# Patient Record
Sex: MALE | URBAN - METROPOLITAN AREA
[De-identification: names, ages, dates, MRNs, and addresses within clinical notes are randomized per-mention and may not be internally consistent; named-entity substitution may affect disease eponyms.]

---

## 2018-01-30 NOTE — PATIENT DISCUSSION
Eylea #1 injection recommended today after discussion of benefits, risks, alternatives. The patient elects to proceed. The injection was administered without complication. Post-injection instructions were reviewed and understood by the patient.

## 2018-01-30 NOTE — PROCEDURE NOTE: CLINICAL
PROCEDURE NOTE: Eylea #1 OS. Diagnosis: Neovascular AMD with Active CNV. Prep: Betadine Drops and Betadine Scrub. Prior to injection, risks/benefits/alternatives discussed including corneal abrasion, infection, loss of vision, hemorrhage, cataract, glaucoma, retinal tears or detachment. A written consent is on file, and the need for today’s injection was discussed and the patient is understanding and wishes to proceed. The entire vial of Eylea was drawn up into a syringe. The opened vial, remaining drug, and filtered needle were disposed of in a certified biohazard container. Betadine prep was performed. Topical anesthesia was induced with Alcaine. 4% lidocaine pledge. A lid speculum was used. A short 30g needle on a 1cc syringe was used with product that that had previously been prepared under sterile conditions. Injection site: 3-4 mm from the limbus. The used syringe/needle was transferred to a biohazard container. Lid speculum removed. Mask worn during procedure. Patient tolerated procedure well. Count fingers vision was verified. There were no complications. Patient was given the standard instruction sheet. Patient given office phone number/answering service number and advised to call immediately should there be loss of vision or pain, or should they have any other questions or concerns. Krystal Flores

## 2018-03-13 NOTE — PATIENT DISCUSSION
Patient understands condition, prognosis and need for follow up care.  Patient to follow up in [place] for surgery.

## 2018-03-13 NOTE — PATIENT DISCUSSION
Stable PED, Stable Vision, No new SRF, No new IRF. Recommend treatment to maintain current level of vision. Eylea injections 3/13/18 and 01/30/18.  Continue Eylea or Avastin per [place] doctor q 6-7 weeks.

## 2018-03-13 NOTE — PROCEDURE NOTE: CLINICAL
PROCEDURE NOTE: Eylea #2 OS. Diagnosis: Neovascular AMD with Active CNV. Prior to injection, risks/benefits/alternatives discussed including corneal abrasion, infection, loss of vision, hemorrhage, cataract, glaucoma, retinal tears or detachment. A written consent is on file, and the need for today’s injection was discussed and the patient is understanding and wishes to proceed. The entire vial of Eylea was drawn up into a syringe. The opened vial, remaining drug, and filtered needle were disposed of in a certified biohazard container. Betadine prep was performed. Topical anesthesia was induced with Alcaine. 4% lidocaine pledge. A lid speculum was used. A short 30g needle on a 1cc syringe was used with product that that had previously been prepared under sterile conditions. Injection site: 3-4 mm from the limbus. The used syringe/needle was transferred to a biohazard container. Lid speculum removed. Mask worn during procedure. Patient tolerated procedure well. Count fingers vision was verified. There were no complications. Patient was given the standard instruction sheet. Patient given office phone number/answering service number and advised to call immediately should there be loss of vision or pain, or should they have any other questions or concerns. Vipul Crouch

## 2018-03-13 NOTE — PATIENT DISCUSSION
Eylea #2 injection recommended today after discussion of benefits, risks, alternatives. The patient elects to proceed. The injection was administered without complication. Post-injection instructions were reviewed and understood by the patient.

## 2019-02-04 NOTE — PATIENT DISCUSSION
Avastin #49 injection recommended today after discussion of benefits, risks, alternatives, and off-label use. The injection was administered without complication. Post-injection instructions were reviewed and understood by the patient.

## 2019-02-04 NOTE — PATIENT DISCUSSION
Pt edu no fluid seen on exam or OCT today. Recommend continuing with treating Q7W. Recommend Avastin #49 today. R & B discussed in detail. Pt elects to proceed. Pt leaving to go [facility], recommend following up with Dr. David Kowalski in 7-8 weeks. Records given to patient.

## 2019-02-04 NOTE — PROCEDURE NOTE: CLINICAL
PROCEDURE NOTE: Avastin () #49 OS. Diagnosis: Neovascular AMD with Active CNV. Prior to the original injection, risks/benefits/alternatives discussed including infection, loss of vision, hemorrhage, cataract, glaucoma, retinal tears or detachment. A written consent is on file, and the need for today’s injection was discussed and the patient is understanding and wishes to proceed. The off-label status of Intravitreal Avastin also was reviewed. The patient wished to proceed with treatment. The patient wished to proceed with treatment. Topical anesthetic drops were applied to the eye. Betadine prep was performed. Surgical mask worn. Sterile drape and lid speculum were applied. Using the syringe provided, Avastin 1.25 mg in 0.05 cc was injected into the vitreous cavity. Injection site: 3-4 mm from the limbus. Patient tolerated procedure well. Following the intravitreal injection, the sterile lid speculum was removed. CRA perfusion confirmed. CF vision checked. Patient given office phone number/answering service number and advised to call immediately should there be an increase in floaters or redness, loss of vision or pain, or should they have any other questions or concerns. Pancho Sorto

## 2019-08-26 ENCOUNTER — IMPORTED ENCOUNTER (OUTPATIENT)
Dept: URBAN - METROPOLITAN AREA CLINIC 9 | Facility: CLINIC | Age: 79
End: 2019-08-26

## 2020-10-15 ENCOUNTER — IMPORTED ENCOUNTER (OUTPATIENT)
Dept: URBAN - METROPOLITAN AREA CLINIC 9 | Facility: CLINIC | Age: 80
End: 2020-10-15

## 2021-10-16 ASSESSMENT — VISUAL ACUITY
OD_CC: 20/30 - SN
OD_CC: 20/25 SN
OS_PH: 20/30 - SN
OS_CC: 20/30 SN
OS_CC: 20/25 - SN
OS_SC: 20/50 - SN
OD_CC: 20/40 SN
OD_PH: 20/30 - SN
OS_CC: 20/40 - SN
OD_SC: 20/70 - SN

## 2021-10-16 ASSESSMENT — TONOMETRY
OD_IOP_MMHG: 12
OS_IOP_MMHG: 8
OD_IOP_MMHG: 10
OS_IOP_MMHG: 11

## 2022-06-19 RX ORDER — CICLOPIROX 7.7 MG/G
GEL TOPICAL
COMMUNITY

## 2022-06-19 RX ORDER — GABAPENTIN 100 MG/1
CAPSULE ORAL
COMMUNITY

## 2022-06-19 RX ORDER — ATORVASTATIN CALCIUM 40 MG/1
TABLET, FILM COATED ORAL
COMMUNITY

## 2022-06-19 RX ORDER — IBUPROFEN 200 MG
TABLET ORAL
COMMUNITY

## 2022-06-19 RX ORDER — LISINOPRIL 10 MG/1
TABLET ORAL
COMMUNITY
End: 2022-09-19

## 2022-08-10 PROBLEM — M17.11 PRIMARY OSTEOARTHRITIS OF RIGHT KNEE: Status: ACTIVE | Noted: 2022-08-10

## 2022-08-10 PROBLEM — I10 ESSENTIAL HYPERTENSION: Status: ACTIVE | Noted: 2022-08-10

## 2022-08-10 PROBLEM — M19.011 PRIMARY OSTEOARTHRITIS OF RIGHT SHOULDER: Status: ACTIVE | Noted: 2022-08-10

## 2022-08-10 PROBLEM — R20.9 SKIN SENSATION DISTURBANCE: Status: ACTIVE | Noted: 2022-08-10

## 2022-08-10 PROBLEM — I71.40 ABDOMINAL AORTIC ANEURYSM (AAA) WITHOUT RUPTURE: Status: ACTIVE | Noted: 2022-08-10

## 2022-08-10 PROBLEM — N52.9 IMPOTENCE OF ORGANIC ORIGIN: Status: ACTIVE | Noted: 2022-08-10

## 2022-08-10 PROBLEM — E11.9 TYPE 2 DIABETES MELLITUS WITHOUT COMPLICATION (HCC): Status: ACTIVE | Noted: 2022-08-10

## 2022-08-10 PROBLEM — M54.2 CERVICALGIA: Status: ACTIVE | Noted: 2022-08-10

## 2022-08-10 PROBLEM — R29.898 DROPPED HEAD SYNDROME: Status: ACTIVE | Noted: 2022-08-10

## 2022-08-10 PROBLEM — I72.4 ANEURYSM OF POPLITEAL ARTERY (HCC): Status: ACTIVE | Noted: 2022-08-10

## 2022-08-10 PROBLEM — C61 MALIGNANT TUMOR OF PROSTATE (HCC): Status: ACTIVE | Noted: 2022-08-10

## 2022-08-10 PROBLEM — M25.511 PAIN IN RIGHT SHOULDER: Status: ACTIVE | Noted: 2022-08-10

## 2022-08-10 PROBLEM — E66.9 OBESITY: Status: ACTIVE | Noted: 2022-08-10

## 2022-08-10 PROBLEM — E78.5 HYPERLIPIDEMIA: Status: ACTIVE | Noted: 2022-08-10

## 2022-08-10 PROBLEM — E55.9 VITAMIN D DEFICIENCY: Status: ACTIVE | Noted: 2022-08-10

## 2024-04-04 ENCOUNTER — NEW PATIENT (OUTPATIENT)
Facility: LOCATION | Age: 84
End: 2024-04-04

## 2024-04-04 DIAGNOSIS — H25.13: ICD-10-CM

## 2024-04-04 DIAGNOSIS — H52.4: ICD-10-CM

## 2024-04-04 PROCEDURE — 92015 DETERMINE REFRACTIVE STATE: CPT

## 2024-04-04 PROCEDURE — 92004 COMPRE OPH EXAM NEW PT 1/>: CPT

## 2024-04-04 ASSESSMENT — VISUAL ACUITY
OD_SC: 20/200
OS_SC: 20/200

## 2024-04-04 ASSESSMENT — TONOMETRY
OS_IOP_MMHG: 15
OD_IOP_MMHG: 12

## 2024-05-21 ENCOUNTER — ESTABLISHED PATIENT (OUTPATIENT)
Facility: LOCATION | Age: 84
End: 2024-05-21

## 2024-05-21 DIAGNOSIS — H25.13: ICD-10-CM

## 2024-05-21 PROCEDURE — 92014 COMPRE OPH EXAM EST PT 1/>: CPT

## 2024-05-21 PROCEDURE — 92136 OPHTHALMIC BIOMETRY: CPT

## 2024-05-21 ASSESSMENT — VISUAL ACUITY
OD_PH: 20/70
OS_BCVA: 20/400
OD_SC: 20/100
OS_SC: 20/200
OD_BCVA: 20/60

## 2024-05-21 ASSESSMENT — TONOMETRY
OS_IOP_MMHG: 13
OD_IOP_MMHG: 14

## 2024-06-05 ENCOUNTER — TECH ONLY (OUTPATIENT)
Dept: URBAN - METROPOLITAN AREA CLINIC 14 | Facility: CLINIC | Age: 84
End: 2024-06-05

## 2024-06-05 DIAGNOSIS — H25.13: ICD-10-CM

## 2024-06-05 PROCEDURE — 76510 OPH US DX B-SCAN&QUAN A-SCAN: CPT | Mod: NC

## 2024-06-07 ENCOUNTER — ESTABLISHED PATIENT (OUTPATIENT)
Dept: URBAN - METROPOLITAN AREA CLINIC 11 | Facility: CLINIC | Age: 84
End: 2024-06-07

## 2024-06-07 DIAGNOSIS — H43.812: ICD-10-CM

## 2024-06-07 DIAGNOSIS — H35.433: ICD-10-CM

## 2024-06-07 DIAGNOSIS — H35.371: ICD-10-CM

## 2024-06-07 DIAGNOSIS — H43.821: ICD-10-CM

## 2024-06-07 DIAGNOSIS — E11.9: ICD-10-CM

## 2024-06-07 PROCEDURE — 92201 OPSCPY EXTND RTA DRAW UNI/BI: CPT

## 2024-06-07 PROCEDURE — 92014 COMPRE OPH EXAM EST PT 1/>: CPT

## 2024-06-07 PROCEDURE — 92134 CPTRZ OPH DX IMG PST SGM RTA: CPT

## 2024-06-07 ASSESSMENT — VISUAL ACUITY
OD_SC: 20/100
OS_SC: 20/400

## 2024-06-07 ASSESSMENT — TONOMETRY
OD_IOP_MMHG: 5
OS_IOP_MMHG: 5

## 2024-07-02 ENCOUNTER — SURGERY/PROCEDURE (OUTPATIENT)
Dept: URBAN - METROPOLITAN AREA SURGERY 6 | Facility: SURGERY | Age: 84
End: 2024-07-02

## 2024-07-02 ENCOUNTER — POST OP/EVAL OF SECOND EYE (OUTPATIENT)
Dept: URBAN - METROPOLITAN AREA CLINIC 10 | Facility: CLINIC | Age: 84
End: 2024-07-02

## 2024-07-02 DIAGNOSIS — H25.13: ICD-10-CM

## 2024-07-02 DIAGNOSIS — Z96.1: ICD-10-CM

## 2024-07-02 PROCEDURE — 66984 XCAPSL CTRC RMVL W/O ECP: CPT

## 2024-07-02 ASSESSMENT — TONOMETRY
OD_IOP_MMHG: 13
OS_IOP_MMHG: 23

## 2024-07-02 ASSESSMENT — VISUAL ACUITY
OD_BCVA: 20/60
OD_SC: 20/100
OS_SC: 20/25
OD_PH: 20/70

## 2024-07-03 ENCOUNTER — SURGERY/PROCEDURE (OUTPATIENT)
Dept: URBAN - METROPOLITAN AREA SURGERY 6 | Facility: SURGERY | Age: 84
End: 2024-07-03

## 2024-07-03 ENCOUNTER — POST-OP (OUTPATIENT)
Facility: LOCATION | Age: 84
End: 2024-07-03

## 2024-07-03 DIAGNOSIS — Z98.890: ICD-10-CM

## 2024-07-03 DIAGNOSIS — Z96.1: ICD-10-CM

## 2024-07-03 DIAGNOSIS — H25.13: ICD-10-CM

## 2024-07-03 PROCEDURE — 66984 XCAPSL CTRC RMVL W/O ECP: CPT | Mod: 79,RT

## 2024-07-03 PROCEDURE — 99024 POSTOP FOLLOW-UP VISIT: CPT

## 2024-07-03 ASSESSMENT — VISUAL ACUITY
OD_SC: 20/40
OS_SC: 20/30+1

## 2024-07-03 ASSESSMENT — TONOMETRY
OS_IOP_MMHG: 13
OD_IOP_MMHG: 15

## 2024-07-25 ENCOUNTER — POST-OP (OUTPATIENT)
Facility: LOCATION | Age: 84
End: 2024-07-25

## 2024-07-25 DIAGNOSIS — Z96.1: ICD-10-CM

## 2024-07-25 DIAGNOSIS — Z98.890: ICD-10-CM

## 2024-07-25 PROCEDURE — 99024 POSTOP FOLLOW-UP VISIT: CPT

## 2024-07-25 ASSESSMENT — KERATOMETRY
OS_K2POWER_DIOPTERS: 43.00
OS_AXISANGLE2_DEGREES: 160
OD_AXISANGLE2_DEGREES: 9
OS_K1POWER_DIOPTERS: 42.25
OD_AXISANGLE_DEGREES: 99
OD_K1POWER_DIOPTERS: 41.75
OD_K2POWER_DIOPTERS: 43.00
OS_AXISANGLE_DEGREES: 70

## 2024-07-25 ASSESSMENT — TONOMETRY
OD_IOP_MMHG: 15
OS_IOP_MMHG: 12

## 2024-07-25 ASSESSMENT — VISUAL ACUITY
OU_SC: 20/30+2
OS_SC: 20/25
OD_SC: 20/30-1

## 2024-08-21 ENCOUNTER — EMERGENCY VISIT (OUTPATIENT)
Facility: LOCATION | Age: 84
End: 2024-08-21

## 2024-08-21 DIAGNOSIS — H10.12: ICD-10-CM

## 2024-08-21 PROCEDURE — 99214 OFFICE O/P EST MOD 30 MIN: CPT

## 2024-08-21 ASSESSMENT — TONOMETRY
OS_IOP_MMHG: 8
OD_IOP_MMHG: 9

## 2024-08-21 ASSESSMENT — VISUAL ACUITY
OD_SC: 20/30+2
OS_SC: 20/30-1

## 2024-08-28 ENCOUNTER — FOLLOW UP (OUTPATIENT)
Facility: LOCATION | Age: 84
End: 2024-08-28

## 2024-08-28 DIAGNOSIS — H10.12: ICD-10-CM

## 2024-08-28 PROCEDURE — 99214 OFFICE O/P EST MOD 30 MIN: CPT

## 2024-08-28 ASSESSMENT — VISUAL ACUITY
OD_SC: 20/30
OS_SC: 20/30

## 2024-08-28 ASSESSMENT — TONOMETRY
OS_IOP_MMHG: 8
OD_IOP_MMHG: 8

## 2024-10-17 ENCOUNTER — EMERGENCY VISIT (OUTPATIENT)
Facility: LOCATION | Age: 84
End: 2024-10-17

## 2024-10-17 DIAGNOSIS — H10.12: ICD-10-CM

## 2024-10-17 PROCEDURE — 99214 OFFICE O/P EST MOD 30 MIN: CPT

## 2024-10-25 ENCOUNTER — FOLLOW UP (OUTPATIENT)
Facility: LOCATION | Age: 84
End: 2024-10-25

## 2024-10-25 DIAGNOSIS — H10.12: ICD-10-CM

## 2024-10-25 PROCEDURE — 99214 OFFICE O/P EST MOD 30 MIN: CPT

## 2024-10-28 ENCOUNTER — COMPREHENSIVE EXAM (OUTPATIENT)
Dept: URBAN - METROPOLITAN AREA CLINIC 11 | Facility: CLINIC | Age: 84
End: 2024-10-28

## 2024-10-28 DIAGNOSIS — H43.812: ICD-10-CM

## 2024-10-28 DIAGNOSIS — H35.352: ICD-10-CM

## 2024-10-28 DIAGNOSIS — E11.9: ICD-10-CM

## 2024-10-28 DIAGNOSIS — H35.433: ICD-10-CM

## 2024-10-28 DIAGNOSIS — H43.821: ICD-10-CM

## 2024-10-28 DIAGNOSIS — H35.371: ICD-10-CM

## 2024-10-28 PROCEDURE — 92134 CPTRZ OPH DX IMG PST SGM RTA: CPT

## 2024-10-28 PROCEDURE — 92014 COMPRE OPH EXAM EST PT 1/>: CPT

## 2024-10-28 PROCEDURE — 92201 OPSCPY EXTND RTA DRAW UNI/BI: CPT

## 2024-11-11 ENCOUNTER — FOLLOW UP (OUTPATIENT)
Dept: URBAN - METROPOLITAN AREA CLINIC 11 | Facility: CLINIC | Age: 84
End: 2024-11-11

## 2024-11-11 DIAGNOSIS — H35.371: ICD-10-CM

## 2024-11-11 DIAGNOSIS — H43.821: ICD-10-CM

## 2024-11-11 DIAGNOSIS — H35.352: ICD-10-CM

## 2024-11-11 DIAGNOSIS — E11.9: ICD-10-CM

## 2024-11-11 DIAGNOSIS — H43.812: ICD-10-CM

## 2024-11-11 DIAGNOSIS — H35.433: ICD-10-CM

## 2024-11-11 PROCEDURE — 92134 CPTRZ OPH DX IMG PST SGM RTA: CPT

## 2024-11-11 PROCEDURE — 67515 INJECT/TREAT EYE SOCKET: CPT

## 2024-11-11 PROCEDURE — 99214 OFFICE O/P EST MOD 30 MIN: CPT | Mod: 25

## 2024-12-09 ENCOUNTER — FOLLOW UP (OUTPATIENT)
Age: 84
End: 2024-12-09

## 2024-12-09 DIAGNOSIS — E11.9: ICD-10-CM

## 2024-12-09 DIAGNOSIS — H35.352: ICD-10-CM

## 2024-12-09 DIAGNOSIS — H35.371: ICD-10-CM

## 2024-12-09 DIAGNOSIS — H43.821: ICD-10-CM

## 2024-12-09 DIAGNOSIS — H35.433: ICD-10-CM

## 2024-12-09 DIAGNOSIS — H43.812: ICD-10-CM

## 2024-12-09 PROCEDURE — 92134 CPTRZ OPH DX IMG PST SGM RTA: CPT

## 2024-12-09 PROCEDURE — 99213 OFFICE O/P EST LOW 20 MIN: CPT

## 2025-01-30 ENCOUNTER — EMERGENCY VISIT (OUTPATIENT)
Age: 85
End: 2025-01-30

## 2025-01-30 DIAGNOSIS — H35.433: ICD-10-CM

## 2025-01-30 DIAGNOSIS — H43.812: ICD-10-CM

## 2025-01-30 DIAGNOSIS — H10.9: ICD-10-CM

## 2025-01-30 DIAGNOSIS — H35.371: ICD-10-CM

## 2025-01-30 DIAGNOSIS — H43.821: ICD-10-CM

## 2025-01-30 DIAGNOSIS — E11.9: ICD-10-CM

## 2025-01-30 PROCEDURE — 99213 OFFICE O/P EST LOW 20 MIN: CPT

## 2025-01-30 PROCEDURE — 92134 CPTRZ OPH DX IMG PST SGM RTA: CPT

## 2025-04-25 ENCOUNTER — EMERGENCY VISIT (OUTPATIENT)
Age: 85
End: 2025-04-25

## 2025-04-25 DIAGNOSIS — E11.9: ICD-10-CM

## 2025-04-25 DIAGNOSIS — H43.812: ICD-10-CM

## 2025-04-25 DIAGNOSIS — H43.821: ICD-10-CM

## 2025-04-25 DIAGNOSIS — H35.371: ICD-10-CM

## 2025-04-25 DIAGNOSIS — H04.122: ICD-10-CM

## 2025-04-25 DIAGNOSIS — H35.433: ICD-10-CM

## 2025-04-25 PROCEDURE — 92134 CPTRZ OPH DX IMG PST SGM RTA: CPT

## 2025-04-25 PROCEDURE — 99213 OFFICE O/P EST LOW 20 MIN: CPT

## 2025-05-20 ENCOUNTER — COMPREHENSIVE EXAM (OUTPATIENT)
Age: 85
End: 2025-05-20

## 2025-05-20 DIAGNOSIS — H26.493: ICD-10-CM

## 2025-05-20 PROCEDURE — 92014 COMPRE OPH EXAM EST PT 1/>: CPT
